# Patient Record
Sex: FEMALE
[De-identification: names, ages, dates, MRNs, and addresses within clinical notes are randomized per-mention and may not be internally consistent; named-entity substitution may affect disease eponyms.]

---

## 2020-03-03 PROBLEM — Z00.00 ENCOUNTER FOR PREVENTIVE HEALTH EXAMINATION: Status: ACTIVE | Noted: 2020-03-03

## 2020-03-12 ENCOUNTER — APPOINTMENT (OUTPATIENT)
Dept: RHEUMATOLOGY | Facility: CLINIC | Age: 25
End: 2020-03-12
Payer: MEDICAID

## 2020-03-12 VITALS
WEIGHT: 135 LBS | TEMPERATURE: 98.1 F | HEART RATE: 78 BPM | HEIGHT: 66 IN | BODY MASS INDEX: 21.69 KG/M2 | SYSTOLIC BLOOD PRESSURE: 102 MMHG | OXYGEN SATURATION: 98 % | DIASTOLIC BLOOD PRESSURE: 71 MMHG

## 2020-03-12 DIAGNOSIS — Z86.59 PERSONAL HISTORY OF OTHER MENTAL AND BEHAVIORAL DISORDERS: ICD-10-CM

## 2020-03-12 DIAGNOSIS — Z82.69 FAMILY HISTORY OF OTHER DISEASES OF THE MUSCULOSKELETAL SYSTEM AND CONNECTIVE TISSUE: ICD-10-CM

## 2020-03-12 DIAGNOSIS — Z82.61 FAMILY HISTORY OF ARTHRITIS: ICD-10-CM

## 2020-03-12 DIAGNOSIS — Z78.9 OTHER SPECIFIED HEALTH STATUS: ICD-10-CM

## 2020-03-12 DIAGNOSIS — H04.129 DRY EYE SYNDROME OF UNSPECIFIED LACRIMAL GLAND: ICD-10-CM

## 2020-03-12 LAB
BASOPHILS # BLD AUTO: 0.05 K/UL
BASOPHILS NFR BLD AUTO: 0.5 %
EOSINOPHIL # BLD AUTO: 0.09 K/UL
EOSINOPHIL NFR BLD AUTO: 1 %
ERYTHROCYTE [SEDIMENTATION RATE] IN BLOOD BY WESTERGREN METHOD: 31 MM/HR
HCT VFR BLD CALC: 43.8 %
HGB BLD-MCNC: 13.6 G/DL
IMM GRANULOCYTES NFR BLD AUTO: 0.3 %
LYMPHOCYTES # BLD AUTO: 1.96 K/UL
LYMPHOCYTES NFR BLD AUTO: 20.8 %
MAN DIFF?: NORMAL
MCHC RBC-ENTMCNC: 31.1 GM/DL
MCHC RBC-ENTMCNC: 31.3 PG
MCV RBC AUTO: 100.9 FL
MONOCYTES # BLD AUTO: 0.62 K/UL
MONOCYTES NFR BLD AUTO: 6.6 %
NEUTROPHILS # BLD AUTO: 6.68 K/UL
NEUTROPHILS NFR BLD AUTO: 70.8 %
PLATELET # BLD AUTO: 295 K/UL
RBC # BLD: 4.34 M/UL
RBC # FLD: 11.7 %
RHEUMATOID FACT SER QL: <10 IU/ML
WBC # FLD AUTO: 9.43 K/UL

## 2020-03-12 PROCEDURE — 99204 OFFICE O/P NEW MOD 45 MIN: CPT

## 2020-03-12 RX ORDER — SPIRONOLACTONE 50 MG/1
TABLET ORAL
Refills: 0 | Status: ACTIVE | COMMUNITY

## 2020-03-12 RX ORDER — ARIPIPRAZOLE 10 MG/1
10 TABLET ORAL
Refills: 0 | Status: ACTIVE | COMMUNITY

## 2020-03-12 NOTE — REVIEW OF SYSTEMS
[Feeling Poorly] : feeling poorly [Feeling Tired] : feeling tired [Dry Eyes] : dryness of the eyes [As Noted in HPI] : as noted in HPI [Joint Pain] : joint pain [Negative] : Heme/Lymph

## 2020-03-20 LAB
ALBUMIN SERPL ELPH-MCNC: 4.8 G/DL
ALP BLD-CCNC: 64 U/L
ALT SERPL-CCNC: 13 U/L
ANA SER IF-ACNC: NEGATIVE
ANION GAP SERPL CALC-SCNC: 23 MMOL/L
AST SERPL-CCNC: 15 U/L
BILIRUB SERPL-MCNC: 0.3 MG/DL
BUN SERPL-MCNC: 11 MG/DL
CALCIUM SERPL-MCNC: 9.7 MG/DL
CCP AB SER IA-ACNC: <8 UNITS
CHLORIDE SERPL-SCNC: 102 MMOL/L
CO2 SERPL-SCNC: 20 MMOL/L
CREAT SERPL-MCNC: 0.8 MG/DL
CRP SERPL-MCNC: 0.57 MG/DL
DEPRECATED KAPPA LC FREE/LAMBDA SER: 1.3 RATIO
DSDNA AB SER-ACNC: <12 IU/ML
ENA RNP AB SER IA-ACNC: <0.2 AL
ENA SCL70 IGG SER IA-ACNC: <0.2 AL
ENA SM AB SER IA-ACNC: <0.2 AL
ENA SS-A AB SER IA-ACNC: <0.2 AL
ENA SS-B AB SER IA-ACNC: <0.2 AL
FERRITIN SERPL-MCNC: 45 NG/ML
GLUCOSE SERPL-MCNC: 95 MG/DL
IGA SER QL IEP: 196 MG/DL
IGG SER QL IEP: 740 MG/DL
IGM SER QL IEP: 186 MG/DL
IRON SATN MFR SERPL: 13 %
IRON SERPL-MCNC: 41 UG/DL
KAPPA LC CSF-MCNC: 1.15 MG/DL
KAPPA LC SERPL-MCNC: 1.49 MG/DL
POTASSIUM SERPL-SCNC: 4.4 MMOL/L
PROT SERPL-MCNC: 7.4 G/DL
RF+CCP IGG SER-IMP: NEGATIVE
SODIUM SERPL-SCNC: 144 MMOL/L
TIBC SERPL-MCNC: 316 UG/DL
TSH SERPL-ACNC: 4.41 UIU/ML
UIBC SERPL-MCNC: 276 UG/DL

## 2020-05-19 ENCOUNTER — APPOINTMENT (OUTPATIENT)
Dept: RHEUMATOLOGY | Facility: CLINIC | Age: 25
End: 2020-05-19
Payer: MEDICAID

## 2020-05-19 DIAGNOSIS — M13.0 POLYARTHRITIS, UNSPECIFIED: ICD-10-CM

## 2020-05-19 DIAGNOSIS — M35.7 HYPERMOBILITY SYNDROME: ICD-10-CM

## 2020-05-19 DIAGNOSIS — M25.561 PAIN IN RIGHT KNEE: ICD-10-CM

## 2020-05-19 DIAGNOSIS — M25.562 PAIN IN RIGHT KNEE: ICD-10-CM

## 2020-05-19 PROCEDURE — 99442: CPT

## 2020-05-19 RX ORDER — LITHIUM CARBONATE 450 MG/1
TABLET ORAL
Refills: 0 | Status: DISCONTINUED | COMMUNITY
End: 2020-05-19

## 2020-05-19 NOTE — ASSESSMENT
[FreeTextEntry1] : 24 year-old female with hx of fibromyalgia, with worsening joint pain and stiffness and dry eyes\par family hx of RA and Sjogren's\par \par 1. Polyarthralgia - labs with elevated ESR and CRP, sle and RA labs are negative - repeat labs today- start naproxen 500 mg bid as needed\par 2. Hypermobile joints - possibly contributing to patient's symptoms. \par 3. Knee and shoulder pain - worsening with locking and buckling over the left knee - x-rays were normal - will request MRI r/o tendon tear\par 4. Costochondritis - taking tylenol and doing stretches exercises -\par \par \par This was a telephonic encounter. The patient has been given ample opportunity to discuss any questions regarding [facility]’s telehealth services. All of the patients questions have been answered to satisfaction.\par Verbal consent was obtain\par Provider Location: [address] [place] [address]\par Patient Location: Home\par Duration: 15 minutes\par \par I reviewed previous labs results with patients.\par Laboratory tests ordered today\par Diagnosis and Prognosis discussed\par Continue with current medications\par education provided on \par F/u 2 months\par \par \par

## 2020-05-19 NOTE — ASSESSMENT
[FreeTextEntry1] : 24 year-old female with hx of fibromyalgia, with worsening joint pain and stiffness and dry eyes\par family hx of RA and Sjogren's\par \par 1. Polyarthralgia - fibromyalgia X inflammatory arthritis - add full rheum work-up based on symptoms and family hx. \par 2. Hypermobile joints - possibly contributing to patient's symptoms. \par \par knee x-rays\par \par patient to call in 1 week to discuss results and next steps\par

## 2020-05-19 NOTE — PHYSICAL EXAM
[General Appearance - Alert] : alert [General Appearance - In No Acute Distress] : in no acute distress [Heart Sounds Gallop] : no gallops [Murmurs] : no murmurs [Heart Sounds Pericardial Friction Rub] : no pericardial rub [Full Pulse] : the pedal pulses are present [Edema] : there was no peripheral edema [Cervical Lymph Nodes Enlarged Anterior Bilaterally] : anterior cervical [Cervical Lymph Nodes Enlarged Posterior Bilaterally] : posterior cervical [Supraclavicular Lymph Nodes Enlarged Bilaterally] : supraclavicular [No CVA Tenderness] : no ~M costovertebral angle tenderness [No Spinal Tenderness] : no spinal tenderness [Skin Color & Pigmentation] : normal skin color and pigmentation [Skin Turgor] : normal skin turgor [] : no rash [Oriented To Time, Place, And Person] : oriented to person, place, and time [Affect] : the affect was normal [Impaired Insight] : insight and judgment were intact [FreeTextEntry1] : tender joints: right shoulder to the GH joint - patient is hypermobile

## 2020-05-19 NOTE — HISTORY OF PRESENT ILLNESS
[___ Month(s) Ago] : [unfilled] month(s) ago [Currently Experiencing] : currently [Arthralgias] : arthralgias [FreeTextEntry1] : ongoing pain worse over bilateral shoulder and knee pain - left knee and right shoulder are worse, no swelling\par left knee with buckling and locking\par intermittent - worse when standing - shoulder pain worse in the morning, improves after 2-3 hours - no swelling or stiffness\par taking tylenol 500 mg - 100 mg daily\par currently with costochondritis -\par

## 2020-05-19 NOTE — PHYSICAL EXAM

## 2020-05-19 NOTE — REVIEW OF SYSTEMS
[Feeling Poorly] : feeling poorly [Feeling Tired] : feeling tired [Dry Eyes] : dryness of the eyes [Joint Pain] : joint pain [As Noted in HPI] : as noted in HPI [Negative] : Heme/Lymph

## 2020-05-19 NOTE — HISTORY OF PRESENT ILLNESS
[Arthralgias] : arthralgias [FreeTextEntry1] : patient with complains of joint pain and stiffness for about 1 year - affecting mostly her right shoulder and knee and left 2nd digit - no swelling. \par Morning stiffness affecting her shoulder about 3 hours\par No testing or x-rays done\par Sister was diagnosed with Sjogren;s about 1 year ago , aunt with RA\par Reports sternum pain reproducible\par Dry eyes - uses gel drops - \par Ongoing fatigue - \par has a hx of fibromyalgia - not treated at this time  - previously on pregabalin - stopped due to insurance issues\par \par Denies any fevers, chill, rashes, weight loss,fatigue,  hair loss, dry  mouth, mouth sores, Raynaud's. chest pain or SOB, GI or , numbness/tingling\par  [Currently Experiencing] : currently

## 2020-07-15 ENCOUNTER — RX RENEWAL (OUTPATIENT)
Age: 25
End: 2020-07-15

## 2020-09-11 ENCOUNTER — RX RENEWAL (OUTPATIENT)
Age: 25
End: 2020-09-11

## 2020-09-11 RX ORDER — NAPROXEN 500 MG/1
500 TABLET ORAL
Qty: 60 | Refills: 1 | Status: ACTIVE | COMMUNITY
Start: 2020-05-19 | End: 1900-01-01

## 2023-02-13 ENCOUNTER — APPOINTMENT (OUTPATIENT)
Dept: OBGYN | Facility: CLINIC | Age: 28
End: 2023-02-13

## 2024-01-04 ENCOUNTER — APPOINTMENT (OUTPATIENT)
Dept: ENDOCRINOLOGY | Facility: CLINIC | Age: 29
End: 2024-01-04